# Patient Record
Sex: FEMALE | Race: BLACK OR AFRICAN AMERICAN | Employment: FULL TIME | URBAN - METROPOLITAN AREA
[De-identification: names, ages, dates, MRNs, and addresses within clinical notes are randomized per-mention and may not be internally consistent; named-entity substitution may affect disease eponyms.]

---

## 2022-03-18 PROBLEM — D25.1 INTRAMURAL, SUBMUCOUS, AND SUBSEROUS LEIOMYOMA OF UTERUS: Status: ACTIVE | Noted: 2020-12-11

## 2022-03-18 PROBLEM — D25.2 INTRAMURAL, SUBMUCOUS, AND SUBSEROUS LEIOMYOMA OF UTERUS: Status: ACTIVE | Noted: 2020-12-11

## 2022-03-18 PROBLEM — Z90.710 S/P HYSTERECTOMY: Status: ACTIVE | Noted: 2020-12-22

## 2022-03-19 PROBLEM — D50.0 ANEMIA DUE TO CHRONIC BLOOD LOSS: Status: ACTIVE | Noted: 2020-12-11

## 2022-03-19 PROBLEM — Z80.41 FAMILY HISTORY OF OVARIAN CANCER: Status: ACTIVE | Noted: 2020-12-11

## 2022-03-19 PROBLEM — N92.0 MENORRHAGIA WITH REGULAR CYCLE: Status: ACTIVE | Noted: 2020-12-11

## 2023-07-20 ENCOUNTER — TRANSCRIBE ORDERS (OUTPATIENT)
Dept: SCHEDULING | Age: 48
End: 2023-07-20

## 2023-07-20 DIAGNOSIS — Z12.31 SCREENING MAMMOGRAM FOR HIGH-RISK PATIENT: Primary | ICD-10-CM

## 2023-07-21 ENCOUNTER — HOSPITAL ENCOUNTER (OUTPATIENT)
Dept: MAMMOGRAPHY | Age: 48
Discharge: HOME | End: 2023-07-21
Attending: OBSTETRICS & GYNECOLOGY
Payer: COMMERCIAL

## 2023-07-21 DIAGNOSIS — Z12.31 SCREENING MAMMOGRAM FOR HIGH-RISK PATIENT: ICD-10-CM

## 2023-07-21 PROCEDURE — 77063 BREAST TOMOSYNTHESIS BI: CPT

## 2023-08-30 NOTE — PROGRESS NOTES
Name: Josephine Paul  Date: 2023  YOB: 1975  LMP: No LMP recorded. Patient has had a hysterectomy. Mary Kay Jones is a 51 y.o.   who is here for a annual exam.     Birth control:  hyst  Menstrual status: none  Gyn Surgery:  s/p [facility]    Health Maintenance:  Pap Smear: last pap in  3/30/22  WNL  HPV vaccine: not done  If 40 or older, Mammo: last mammo in 23, pathology negative  If 39 or older, Colonoscopy: never  If postmenopausal, Dexa scan: not needed    Review of Systems   Constitutional: Negative. HENT: Negative. Eyes: Negative. Respiratory: Negative. Cardiovascular: Negative. Gastrointestinal: Negative. Endocrine: Negative. Genitourinary: Negative. Musculoskeletal: Negative. Skin: Negative. Allergic/Immunologic: Negative. Hematological: Negative. Psychiatric/Behavioral: Negative. Negative for self-injury and suicidal ideas. Past Medical History:  No date: Abnormal Pap smear  No date: Allergic rhinitis  No date: Anemia      Comment:  pt's hgb 7.5 (10/21/2020)- pt on iron supplement   No date: Anxiety      Comment:  mild- pt   No date: Asthma      Comment:  \"bronchial asthma\"- triggers are                perfume/cologne/flowers; pt has rescue inhaler PRN  : BRCA negative  No date: Compartment syndrome (HCC)  No date: Extensor tendinitis of foot  No date: Incontinence of urine      Comment:  when coughing or sneezing  No date: Insomnia  No date: Menorrhagia  No date: Obesity      Comment:  bmi- [id number]     Past Surgical History:  10 yrs ago: GYN      Comment:  Cryosurgery  No date: HYSTERECTOMY (CERVIX STATUS UNKNOWN)  2020: HYSTERECTOMY (CERVIX STATUS UNKNOWN)      Comment:  Total Laparoscopic Hysterectomy greater than 250 grams,                WITH Bilateral Salpingectomy  No date: ORTHOPEDIC SURGERY;  Left      Comment:  leg x3 in 2020: SALPINGECTOMY       Current Outpatient Medications:     mometasone (NASONEX) 50

## 2023-08-31 ENCOUNTER — OFFICE VISIT (OUTPATIENT)
Dept: OBGYN CLINIC | Age: 48
End: 2023-08-31
Payer: COMMERCIAL

## 2023-08-31 VITALS
SYSTOLIC BLOOD PRESSURE: 138 MMHG | DIASTOLIC BLOOD PRESSURE: 80 MMHG | BODY MASS INDEX: 44.36 KG/M2 | WEIGHT: 276 LBS | HEIGHT: 66 IN

## 2023-08-31 DIAGNOSIS — Z01.419 WELL WOMAN EXAM WITH ROUTINE GYNECOLOGICAL EXAM: Primary | ICD-10-CM

## 2023-08-31 DIAGNOSIS — Z12.4 CERVICAL CANCER SCREENING: ICD-10-CM

## 2023-08-31 DIAGNOSIS — Z11.51 ENCOUNTER FOR SCREENING FOR HUMAN PAPILLOMAVIRUS (HPV): ICD-10-CM

## 2023-08-31 DIAGNOSIS — Z12.31 OTHER SCREENING MAMMOGRAM: ICD-10-CM

## 2023-08-31 PROCEDURE — 99396 PREV VISIT EST AGE 40-64: CPT | Performed by: OBSTETRICS & GYNECOLOGY

## 2023-08-31 RX ORDER — ACETAMINOPHEN 325 MG/1
650 TABLET ORAL EVERY 6 HOURS PRN
COMMUNITY

## 2023-08-31 RX ORDER — PREDNISONE 5 MG/1
TABLET ORAL
COMMUNITY
Start: 2023-08-22

## 2023-08-31 RX ORDER — ALBUTEROL SULFATE 90 UG/1
2 AEROSOL, METERED RESPIRATORY (INHALATION) PRN
COMMUNITY

## 2023-08-31 RX ORDER — FLUTICASONE PROPIONATE 50 MCG
1 SPRAY, SUSPENSION (ML) NASAL DAILY
COMMUNITY

## 2023-08-31 RX ORDER — ASPIRIN 81 MG/1
81 TABLET ORAL 2 TIMES DAILY WITH MEALS
COMMUNITY
Start: 2023-04-09

## 2023-08-31 RX ORDER — MOMETASONE FUROATE 50 UG/1
SPRAY, METERED NASAL
COMMUNITY
Start: 2021-10-11

## 2023-08-31 RX ORDER — DICLOFENAC POTASSIUM 50 MG/1
TABLET, FILM COATED ORAL
COMMUNITY
Start: 2021-05-13

## 2023-08-31 SDOH — ECONOMIC STABILITY: INCOME INSECURITY: HOW HARD IS IT FOR YOU TO PAY FOR THE VERY BASICS LIKE FOOD, HOUSING, MEDICAL CARE, AND HEATING?: NOT HARD AT ALL

## 2023-08-31 SDOH — ECONOMIC STABILITY: HOUSING INSECURITY
IN THE LAST 12 MONTHS, WAS THERE A TIME WHEN YOU DID NOT HAVE A STEADY PLACE TO SLEEP OR SLEPT IN A SHELTER (INCLUDING NOW)?: NO

## 2023-08-31 SDOH — ECONOMIC STABILITY: FOOD INSECURITY: WITHIN THE PAST 12 MONTHS, THE FOOD YOU BOUGHT JUST DIDN'T LAST AND YOU DIDN'T HAVE MONEY TO GET MORE.: NEVER TRUE

## 2023-08-31 SDOH — ECONOMIC STABILITY: FOOD INSECURITY: WITHIN THE PAST 12 MONTHS, YOU WORRIED THAT YOUR FOOD WOULD RUN OUT BEFORE YOU GOT MONEY TO BUY MORE.: NEVER TRUE

## 2023-08-31 ASSESSMENT — ENCOUNTER SYMPTOMS
EYES NEGATIVE: 1
ALLERGIC/IMMUNOLOGIC NEGATIVE: 1
GASTROINTESTINAL NEGATIVE: 1
RESPIRATORY NEGATIVE: 1

## 2024-09-04 ENCOUNTER — OFFICE VISIT (OUTPATIENT)
Dept: OBGYN CLINIC | Age: 49
End: 2024-09-04
Payer: COMMERCIAL

## 2024-09-04 VITALS
WEIGHT: 253 LBS | BODY MASS INDEX: 40.66 KG/M2 | SYSTOLIC BLOOD PRESSURE: 124 MMHG | DIASTOLIC BLOOD PRESSURE: 72 MMHG | HEIGHT: 66 IN

## 2024-09-04 DIAGNOSIS — Z12.31 ENCOUNTER FOR SCREENING MAMMOGRAM FOR MALIGNANT NEOPLASM OF BREAST: ICD-10-CM

## 2024-09-04 DIAGNOSIS — Z12.72 SCREENING FOR VAGINAL CANCER: ICD-10-CM

## 2024-09-04 DIAGNOSIS — Z01.419 WELL WOMAN EXAM WITH ROUTINE GYNECOLOGICAL EXAM: Primary | ICD-10-CM

## 2024-09-04 PROCEDURE — 99459 PELVIC EXAMINATION: CPT | Performed by: OBSTETRICS & GYNECOLOGY

## 2024-09-04 PROCEDURE — 99396 PREV VISIT EST AGE 40-64: CPT | Performed by: OBSTETRICS & GYNECOLOGY

## 2024-09-04 SDOH — ECONOMIC STABILITY: FOOD INSECURITY: WITHIN THE PAST 12 MONTHS, YOU WORRIED THAT YOUR FOOD WOULD RUN OUT BEFORE YOU GOT MONEY TO BUY MORE.: NEVER TRUE

## 2024-09-04 SDOH — ECONOMIC STABILITY: FOOD INSECURITY: WITHIN THE PAST 12 MONTHS, THE FOOD YOU BOUGHT JUST DIDN'T LAST AND YOU DIDN'T HAVE MONEY TO GET MORE.: NEVER TRUE

## 2024-09-04 SDOH — ECONOMIC STABILITY: INCOME INSECURITY: HOW HARD IS IT FOR YOU TO PAY FOR THE VERY BASICS LIKE FOOD, HOUSING, MEDICAL CARE, AND HEATING?: NOT HARD AT ALL

## 2024-09-04 ASSESSMENT — ENCOUNTER SYMPTOMS
ALLERGIC/IMMUNOLOGIC NEGATIVE: 1
RESPIRATORY NEGATIVE: 1
GASTROINTESTINAL NEGATIVE: 1
EYES NEGATIVE: 1

## 2024-09-04 NOTE — PROGRESS NOTES
Name: Era Robledo  Date: 2024  YOB: 1975  LMP: No LMP recorded. Patient has had a hysterectomy.      Era is a 49 y.o.   who is here for a annual exam.     Era  reports that she is not currently sexually active. She reports using the following method of birth control/protection: Surgical.  Contraception: status post hysterectomy.   Menstrual status: none  Gyn Surgery:  s/p [facility]      Women's Health Timeline:  No specialty comments available.      Pap History:  Diagnosis:   Date Value Ref Range Status   2022 Comment  Final     Comment:     NEGATIVE FOR INTRAEPITHELIAL LESION OR MALIGNANCY.   2020 Comment  Final     Comment:     NEGATIVE FOR INTRAEPITHELIAL LESION OR MALIGNANCY.   2017 Comment  Final     Comment:     NEGATIVE FOR INTRAEPITHELIAL LESION AND MALIGNANCY.        OB History:  OB History          3    Para   3    Term                AB        Living             SAB        IAB        Ectopic        Molar        Multiple        Live Births                     Medical History:  Past Medical History:  No date: Abnormal Pap smear  No date: Allergic rhinitis  No date: Anemia      Comment:  pt's hgb 7.5 (10/21/2020)- pt on iron supplement   No date: Anxiety      Comment:  mild- pt   No date: Asthma      Comment:  \"bronchial asthma\"- triggers are                perfume/cologne/flowers; pt has rescue inhaler PRN  : BRCA negative  No date: Compartment syndrome (HCC)  No date: Extensor tendinitis of foot  No date: Incontinence of urine      Comment:  when coughing or sneezing  No date: Insomnia  No date: Menorrhagia  No date: Obesity      Comment:  bmi- [id number]     Surgical History:  Past Surgical History:  10 yrs ago: GYN      Comment:  Cryosurgery  No date: HYSTERECTOMY (CERVIX STATUS UNKNOWN)  2020: HYSTERECTOMY (CERVIX STATUS UNKNOWN)      Comment:  Total Laparoscopic Hysterectomy greater than 250 grams,                WITH Bilateral

## 2024-09-10 LAB
COLLECTION METHOD: NORMAL
CYTOLOGIST CVX/VAG CYTO: NORMAL
CYTOLOGY CVX/VAG DOC THIN PREP: NORMAL
HPV APTIMA: NEGATIVE
Lab: NORMAL
OTHER PT INFO: NORMAL
PAP SOURCE: NORMAL
PATH REPORT.FINAL DX SPEC: NORMAL
PREV CYTO INFO: NORMAL
PREV TREATMENT RESULTS: NEGATIVE
PREV TREATMENT: NORMAL
STAT OF ADQ CVX/VAG CYTO-IMP: NORMAL